# Patient Record
Sex: FEMALE | Race: WHITE
[De-identification: names, ages, dates, MRNs, and addresses within clinical notes are randomized per-mention and may not be internally consistent; named-entity substitution may affect disease eponyms.]

---

## 2018-04-26 ENCOUNTER — HOSPITAL ENCOUNTER (OUTPATIENT)
Dept: HOSPITAL 8 - CFH | Age: 78
Discharge: HOME | End: 2018-04-26
Payer: MEDICARE

## 2018-04-26 DIAGNOSIS — M81.0: ICD-10-CM

## 2018-04-26 DIAGNOSIS — R60.0: ICD-10-CM

## 2018-04-26 DIAGNOSIS — M85.88: Primary | ICD-10-CM

## 2018-04-26 PROCEDURE — 77080 DXA BONE DENSITY AXIAL: CPT

## 2018-07-17 ENCOUNTER — HOSPITAL ENCOUNTER (INPATIENT)
Dept: HOSPITAL 8 - ED | Age: 78
LOS: 3 days | Discharge: HOME | DRG: 871 | End: 2018-07-20
Attending: HOSPITALIST | Admitting: HOSPITALIST
Payer: MEDICARE

## 2018-07-17 VITALS — DIASTOLIC BLOOD PRESSURE: 72 MMHG | SYSTOLIC BLOOD PRESSURE: 108 MMHG

## 2018-07-17 VITALS — HEIGHT: 64 IN | BODY MASS INDEX: 29.85 KG/M2 | WEIGHT: 174.83 LBS

## 2018-07-17 VITALS — SYSTOLIC BLOOD PRESSURE: 134 MMHG | DIASTOLIC BLOOD PRESSURE: 68 MMHG

## 2018-07-17 DIAGNOSIS — N39.0: ICD-10-CM

## 2018-07-17 DIAGNOSIS — E87.2: ICD-10-CM

## 2018-07-17 DIAGNOSIS — E78.5: ICD-10-CM

## 2018-07-17 DIAGNOSIS — I10: ICD-10-CM

## 2018-07-17 DIAGNOSIS — Z79.891: ICD-10-CM

## 2018-07-17 DIAGNOSIS — E78.00: ICD-10-CM

## 2018-07-17 DIAGNOSIS — Z90.89: ICD-10-CM

## 2018-07-17 DIAGNOSIS — E44.1: ICD-10-CM

## 2018-07-17 DIAGNOSIS — E86.0: ICD-10-CM

## 2018-07-17 DIAGNOSIS — N17.0: ICD-10-CM

## 2018-07-17 DIAGNOSIS — Z96.659: ICD-10-CM

## 2018-07-17 DIAGNOSIS — Z87.891: ICD-10-CM

## 2018-07-17 DIAGNOSIS — Z90.49: ICD-10-CM

## 2018-07-17 DIAGNOSIS — E87.6: ICD-10-CM

## 2018-07-17 DIAGNOSIS — A41.9: Primary | ICD-10-CM

## 2018-07-17 DIAGNOSIS — Z82.49: ICD-10-CM

## 2018-07-17 DIAGNOSIS — J98.11: ICD-10-CM

## 2018-07-17 DIAGNOSIS — Z90.710: ICD-10-CM

## 2018-07-17 LAB
ALBUMIN SERPL-MCNC: 2.9 G/DL (ref 3.4–5)
ANION GAP SERPL CALC-SCNC: 13 MMOL/L (ref 5–15)
BASOPHILS # BLD AUTO: 0.01 X10^3/UL (ref 0–0.1)
BASOPHILS NFR BLD AUTO: 0 % (ref 0–1)
CALCIUM SERPL-MCNC: 9.1 MG/DL (ref 8.5–10.1)
CHLORIDE SERPL-SCNC: 104 MMOL/L (ref 98–107)
CREAT SERPL-MCNC: 3.24 MG/DL (ref 0.55–1.02)
CULTURE INDICATED?: YES
EOSINOPHIL # BLD AUTO: 0.54 X10^3/UL (ref 0–0.4)
EOSINOPHIL NFR BLD AUTO: 4 % (ref 1–7)
ERYTHROCYTE [DISTWIDTH] IN BLOOD BY AUTOMATED COUNT: 13.6 % (ref 9.6–15.2)
LYMPHOCYTES # BLD AUTO: 0.54 X10^3/UL (ref 1–3.4)
LYMPHOCYTES NFR BLD AUTO: 4 % (ref 22–44)
MCH RBC QN AUTO: 30.6 PG (ref 27–34.8)
MCHC RBC AUTO-ENTMCNC: 33.6 G/DL (ref 32.4–35.8)
MCV RBC AUTO: 91.2 FL (ref 80–100)
MD: NO
MICROSCOPIC: (no result)
MONOCYTES # BLD AUTO: 0.81 X10^3/UL (ref 0.2–0.8)
MONOCYTES NFR BLD AUTO: 5 % (ref 2–9)
NEUTROPHILS # BLD AUTO: 13.5 X10^3/UL (ref 1.8–6.8)
NEUTROPHILS NFR BLD AUTO: 88 % (ref 42–75)
PLATELET # BLD AUTO: 223 X10^3/UL (ref 130–400)
PMV BLD AUTO: 8.7 FL (ref 7.4–10.4)
RBC # BLD AUTO: 5.2 X10^6/UL (ref 3.82–5.3)

## 2018-07-17 PROCEDURE — 93005 ELECTROCARDIOGRAM TRACING: CPT

## 2018-07-17 PROCEDURE — 84133 ASSAY OF URINE POTASSIUM: CPT

## 2018-07-17 PROCEDURE — 85025 COMPLETE CBC W/AUTO DIFF WBC: CPT

## 2018-07-17 PROCEDURE — 87040 BLOOD CULTURE FOR BACTERIA: CPT

## 2018-07-17 PROCEDURE — 76770 US EXAM ABDO BACK WALL COMP: CPT

## 2018-07-17 PROCEDURE — 99285 EMERGENCY DEPT VISIT HI MDM: CPT

## 2018-07-17 PROCEDURE — 83735 ASSAY OF MAGNESIUM: CPT

## 2018-07-17 PROCEDURE — 82570 ASSAY OF URINE CREATININE: CPT

## 2018-07-17 PROCEDURE — 36415 COLL VENOUS BLD VENIPUNCTURE: CPT

## 2018-07-17 PROCEDURE — 82436 ASSAY OF URINE CHLORIDE: CPT

## 2018-07-17 PROCEDURE — 81001 URINALYSIS AUTO W/SCOPE: CPT

## 2018-07-17 PROCEDURE — 71045 X-RAY EXAM CHEST 1 VIEW: CPT

## 2018-07-17 PROCEDURE — 82040 ASSAY OF SERUM ALBUMIN: CPT

## 2018-07-17 PROCEDURE — 83605 ASSAY OF LACTIC ACID: CPT

## 2018-07-17 PROCEDURE — 84100 ASSAY OF PHOSPHORUS: CPT

## 2018-07-17 PROCEDURE — 84145 PROCALCITONIN (PCT): CPT

## 2018-07-17 PROCEDURE — 87086 URINE CULTURE/COLONY COUNT: CPT

## 2018-07-17 PROCEDURE — 96365 THER/PROPH/DIAG IV INF INIT: CPT

## 2018-07-17 PROCEDURE — 93970 EXTREMITY STUDY: CPT

## 2018-07-17 PROCEDURE — 80048 BASIC METABOLIC PNL TOTAL CA: CPT

## 2018-07-17 PROCEDURE — 84300 ASSAY OF URINE SODIUM: CPT

## 2018-07-17 PROCEDURE — 80069 RENAL FUNCTION PANEL: CPT

## 2018-07-17 RX ADMIN — SODIUM CHLORIDE SCH MLS/HR: 0.9 INJECTION, SOLUTION INTRAVENOUS at 23:54

## 2018-07-17 RX ADMIN — SODIUM CHLORIDE SCH MLS/HR: 0.9 INJECTION, SOLUTION INTRAVENOUS at 18:00

## 2018-07-17 RX ADMIN — CEFTRIAXONE SCH MLS/HR: 2 INJECTION, POWDER, FOR SOLUTION INTRAMUSCULAR; INTRAVENOUS at 23:53

## 2018-07-17 RX ADMIN — BUDESONIDE AND FORMOTEROL FUMARATE DIHYDRATE SCH MG: 160; 4.5 AEROSOL RESPIRATORY (INHALATION) at 20:30

## 2018-07-17 RX ADMIN — PRAVASTATIN SODIUM SCH MG: 40 TABLET ORAL at 20:31

## 2018-07-17 RX ADMIN — ZOLPIDEM TARTRATE SCH MG: 10 TABLET, FILM COATED ORAL at 21:58

## 2018-07-18 VITALS — SYSTOLIC BLOOD PRESSURE: 118 MMHG | DIASTOLIC BLOOD PRESSURE: 71 MMHG

## 2018-07-18 VITALS — SYSTOLIC BLOOD PRESSURE: 98 MMHG | DIASTOLIC BLOOD PRESSURE: 62 MMHG

## 2018-07-18 VITALS — DIASTOLIC BLOOD PRESSURE: 58 MMHG | SYSTOLIC BLOOD PRESSURE: 95 MMHG

## 2018-07-18 VITALS — SYSTOLIC BLOOD PRESSURE: 107 MMHG | DIASTOLIC BLOOD PRESSURE: 65 MMHG

## 2018-07-18 VITALS — DIASTOLIC BLOOD PRESSURE: 66 MMHG | SYSTOLIC BLOOD PRESSURE: 106 MMHG

## 2018-07-18 LAB
<PLATELET ESTIMATE>: ADEQUATE
<PLT MORPHOLOGY>: (no result)
ALBUMIN SERPL-MCNC: 2.3 G/DL (ref 3.4–5)
ANION GAP SERPL CALC-SCNC: 10 MMOL/L (ref 5–15)
ANION GAP SERPL CALC-SCNC: 11 MMOL/L (ref 5–15)
BILIRUB DIRECT SERPL-MCNC: NORMAL MG/DL
CALCIUM SERPL-MCNC: 7.8 MG/DL (ref 8.5–10.1)
CALCIUM SERPL-MCNC: 8.2 MG/DL (ref 8.5–10.1)
CHLORIDE SERPL-SCNC: 110 MMOL/L (ref 98–107)
CHLORIDE SERPL-SCNC: 112 MMOL/L (ref 98–107)
CHLORIDE,URINE RANDOM: 58 MMOL/L
CREAT SERPL-MCNC: 1.97 MG/DL (ref 0.55–1.02)
CREAT SERPL-MCNC: 2.25 MG/DL (ref 0.55–1.02)
EOS#(MANUAL): 0.27 X10^3/UL (ref 0–0.4)
EOS% (MANUAL): 3 % (ref 1–7)
ERYTHROCYTE [DISTWIDTH] IN BLOOD BY AUTOMATED COUNT: 13.9 % (ref 9.6–15.2)
HBV SURFACE AB SER RIA-ACNC: 55.2 MG/DL
LYMPH#(MANUAL): 1.17 X10^3/UL (ref 1–3.4)
LYMPHS% (MANUAL): 13 % (ref 22–44)
MCH RBC QN AUTO: 30.8 PG (ref 27–34.8)
MCHC RBC AUTO-ENTMCNC: 33.6 G/DL (ref 32.4–35.8)
MCV RBC AUTO: 91.6 FL (ref 80–100)
MD: YES
MONOS#(MANUAL): 0.99 X10^3/UL (ref 0.3–2.7)
MONOS% (MANUAL): 11 % (ref 2–9)
PLATELET # BLD AUTO: 197 X10^3/UL (ref 130–400)
PMV BLD AUTO: 9 FL (ref 7.4–10.4)
POTASSIUM UR-SCNC: 13 MMOL/L
RBC # BLD AUTO: 4.09 X10^6/UL (ref 3.82–5.3)
SEG#(MANUAL): 6.57 X10^3/UL (ref 1.8–6.8)
SEGS% (MANUAL): 73 % (ref 42–75)
SODIUM,URINE RANDOM: 55 MMOL/L

## 2018-07-18 RX ADMIN — GABAPENTIN SCH MG: 300 CAPSULE ORAL at 08:13

## 2018-07-18 RX ADMIN — CEFTRIAXONE SCH MLS/HR: 2 INJECTION, POWDER, FOR SOLUTION INTRAMUSCULAR; INTRAVENOUS at 23:40

## 2018-07-18 RX ADMIN — ENOXAPARIN SODIUM SCH MG: 30 INJECTION SUBCUTANEOUS at 20:00

## 2018-07-18 RX ADMIN — SODIUM CHLORIDE SCH MLS/HR: 0.9 INJECTION, SOLUTION INTRAVENOUS at 08:13

## 2018-07-18 RX ADMIN — BUDESONIDE AND FORMOTEROL FUMARATE DIHYDRATE SCH MG: 160; 4.5 AEROSOL RESPIRATORY (INHALATION) at 08:12

## 2018-07-18 RX ADMIN — SODIUM CHLORIDE SCH MLS/HR: 0.9 INJECTION, SOLUTION INTRAVENOUS at 16:02

## 2018-07-18 RX ADMIN — ZOLPIDEM TARTRATE SCH MG: 10 TABLET, FILM COATED ORAL at 21:30

## 2018-07-18 RX ADMIN — CALCIUM CARBONATE-CHOLECALCIFEROL TAB 250 MG-125 UNIT SCH TAB: 250-125 TAB at 08:14

## 2018-07-18 RX ADMIN — SODIUM CHLORIDE SCH MLS/HR: 0.9 INJECTION, SOLUTION INTRAVENOUS at 23:40

## 2018-07-18 RX ADMIN — POTASSIUM CHLORIDE SCH MEQ: 20 TABLET, EXTENDED RELEASE ORAL at 17:40

## 2018-07-18 RX ADMIN — PRAVASTATIN SODIUM SCH MG: 40 TABLET ORAL at 21:30

## 2018-07-18 RX ADMIN — BUDESONIDE AND FORMOTEROL FUMARATE DIHYDRATE SCH MG: 160; 4.5 AEROSOL RESPIRATORY (INHALATION) at 21:00

## 2018-07-18 RX ADMIN — AMLODIPINE BESYLATE SCH MG: 5 TABLET ORAL at 21:30

## 2018-07-19 VITALS — DIASTOLIC BLOOD PRESSURE: 62 MMHG | SYSTOLIC BLOOD PRESSURE: 102 MMHG

## 2018-07-19 VITALS — DIASTOLIC BLOOD PRESSURE: 97 MMHG | SYSTOLIC BLOOD PRESSURE: 158 MMHG

## 2018-07-19 VITALS — DIASTOLIC BLOOD PRESSURE: 72 MMHG | SYSTOLIC BLOOD PRESSURE: 123 MMHG

## 2018-07-19 VITALS — SYSTOLIC BLOOD PRESSURE: 143 MMHG | DIASTOLIC BLOOD PRESSURE: 72 MMHG

## 2018-07-19 LAB
ANION GAP SERPL CALC-SCNC: 10 MMOL/L (ref 5–15)
BASOPHILS # BLD AUTO: 0.01 X10^3/UL (ref 0–0.1)
BASOPHILS NFR BLD AUTO: 0 % (ref 0–1)
CALCIUM SERPL-MCNC: 8 MG/DL (ref 8.5–10.1)
CHLORIDE SERPL-SCNC: 118 MMOL/L (ref 98–107)
CREAT SERPL-MCNC: 1.73 MG/DL (ref 0.55–1.02)
EOSINOPHIL # BLD AUTO: 0.62 X10^3/UL (ref 0–0.4)
EOSINOPHIL NFR BLD AUTO: 8 % (ref 1–7)
ERYTHROCYTE [DISTWIDTH] IN BLOOD BY AUTOMATED COUNT: 14.1 % (ref 9.6–15.2)
LYMPHOCYTES # BLD AUTO: 1.4 X10^3/UL (ref 1–3.4)
LYMPHOCYTES NFR BLD AUTO: 17 % (ref 22–44)
MCH RBC QN AUTO: 30.2 PG (ref 27–34.8)
MCHC RBC AUTO-ENTMCNC: 32.8 G/DL (ref 32.4–35.8)
MCV RBC AUTO: 92.2 FL (ref 80–100)
MD: NO
MONOCYTES # BLD AUTO: 0.67 X10^3/UL (ref 0.2–0.8)
MONOCYTES NFR BLD AUTO: 8 % (ref 2–9)
NEUTROPHILS # BLD AUTO: 5.38 X10^3/UL (ref 1.8–6.8)
NEUTROPHILS NFR BLD AUTO: 67 % (ref 42–75)
PLATELET # BLD AUTO: 216 X10^3/UL (ref 130–400)
PMV BLD AUTO: 8.5 FL (ref 7.4–10.4)
RBC # BLD AUTO: 4.03 X10^6/UL (ref 3.82–5.3)

## 2018-07-19 RX ADMIN — ENOXAPARIN SODIUM SCH MG: 30 INJECTION SUBCUTANEOUS at 20:00

## 2018-07-19 RX ADMIN — AMLODIPINE BESYLATE SCH MG: 5 TABLET ORAL at 21:28

## 2018-07-19 RX ADMIN — ZOLPIDEM TARTRATE SCH MG: 10 TABLET, FILM COATED ORAL at 21:29

## 2018-07-19 RX ADMIN — SODIUM CHLORIDE SCH MLS/HR: 0.9 INJECTION, SOLUTION INTRAVENOUS at 07:41

## 2018-07-19 RX ADMIN — GABAPENTIN SCH MG: 300 CAPSULE ORAL at 07:54

## 2018-07-19 RX ADMIN — BUDESONIDE AND FORMOTEROL FUMARATE DIHYDRATE SCH MG: 160; 4.5 AEROSOL RESPIRATORY (INHALATION) at 21:00

## 2018-07-19 RX ADMIN — CALCIUM CARBONATE-CHOLECALCIFEROL TAB 250 MG-125 UNIT SCH TAB: 250-125 TAB at 07:55

## 2018-07-19 RX ADMIN — POTASSIUM CHLORIDE SCH MEQ: 20 TABLET, EXTENDED RELEASE ORAL at 07:41

## 2018-07-19 RX ADMIN — CEFTRIAXONE SCH MLS/HR: 2 INJECTION, POWDER, FOR SOLUTION INTRAMUSCULAR; INTRAVENOUS at 23:40

## 2018-07-19 RX ADMIN — BUDESONIDE AND FORMOTEROL FUMARATE DIHYDRATE SCH MG: 160; 4.5 AEROSOL RESPIRATORY (INHALATION) at 07:54

## 2018-07-19 RX ADMIN — SODIUM CHLORIDE SCH MLS/HR: 0.9 INJECTION, SOLUTION INTRAVENOUS at 16:19

## 2018-07-19 RX ADMIN — PRAVASTATIN SODIUM SCH MG: 40 TABLET ORAL at 21:29

## 2018-07-20 VITALS — SYSTOLIC BLOOD PRESSURE: 127 MMHG | DIASTOLIC BLOOD PRESSURE: 66 MMHG

## 2018-07-20 VITALS — DIASTOLIC BLOOD PRESSURE: 70 MMHG | SYSTOLIC BLOOD PRESSURE: 121 MMHG

## 2018-07-20 VITALS — DIASTOLIC BLOOD PRESSURE: 72 MMHG | SYSTOLIC BLOOD PRESSURE: 116 MMHG

## 2018-07-20 LAB
ANION GAP SERPL CALC-SCNC: 10 MMOL/L (ref 5–15)
CALCIUM SERPL-MCNC: 8.2 MG/DL (ref 8.5–10.1)
CHLORIDE SERPL-SCNC: 118 MMOL/L (ref 98–107)
CREAT SERPL-MCNC: 1.29 MG/DL (ref 0.55–1.02)

## 2018-07-20 RX ADMIN — SODIUM CHLORIDE SCH MLS/HR: 0.9 INJECTION, SOLUTION INTRAVENOUS at 01:04

## 2018-07-20 RX ADMIN — GABAPENTIN SCH MG: 300 CAPSULE ORAL at 07:30

## 2018-07-20 RX ADMIN — CALCIUM CARBONATE-CHOLECALCIFEROL TAB 250 MG-125 UNIT SCH TAB: 250-125 TAB at 08:13

## 2018-07-20 RX ADMIN — BUDESONIDE AND FORMOTEROL FUMARATE DIHYDRATE SCH MG: 160; 4.5 AEROSOL RESPIRATORY (INHALATION) at 07:30

## 2018-07-20 RX ADMIN — AMLODIPINE BESYLATE SCH MG: 5 TABLET ORAL at 08:13

## 2018-07-20 RX ADMIN — SODIUM CHLORIDE SCH MLS/HR: 0.9 INJECTION, SOLUTION INTRAVENOUS at 08:14

## 2020-08-20 ENCOUNTER — HOSPITAL ENCOUNTER (OUTPATIENT)
Dept: HOSPITAL 8 - STAR | Age: 80
Discharge: HOME | End: 2020-08-20
Attending: ORTHOPAEDIC SURGERY
Payer: MEDICARE

## 2020-08-20 DIAGNOSIS — M16.11: ICD-10-CM

## 2020-08-20 DIAGNOSIS — R00.1: ICD-10-CM

## 2020-08-20 DIAGNOSIS — M25.551: ICD-10-CM

## 2020-08-20 DIAGNOSIS — Z11.59: ICD-10-CM

## 2020-08-20 DIAGNOSIS — I21.9: ICD-10-CM

## 2020-08-20 DIAGNOSIS — Z01.818: Primary | ICD-10-CM

## 2020-08-20 LAB
ALBUMIN SERPL-MCNC: 3.5 G/DL (ref 3.4–5)
ALP SERPL-CCNC: 93 U/L (ref 45–117)
ALT SERPL-CCNC: 21 U/L (ref 12–78)
ANION GAP SERPL CALC-SCNC: 7 MMOL/L (ref 5–15)
BASOPHILS # BLD AUTO: 0.06 X10^3/UL (ref 0–0.1)
BASOPHILS NFR BLD AUTO: 1 % (ref 0–1)
BILIRUB SERPL-MCNC: 0.9 MG/DL (ref 0.2–1)
CALCIUM SERPL-MCNC: 9.7 MG/DL (ref 8.5–10.1)
CHLORIDE SERPL-SCNC: 110 MMOL/L (ref 98–107)
CREAT SERPL-MCNC: 1.52 MG/DL (ref 0.55–1.02)
EOSINOPHIL # BLD AUTO: 0.45 X10^3/UL (ref 0–0.4)
EOSINOPHIL NFR BLD AUTO: 4 % (ref 1–7)
ERYTHROCYTE [DISTWIDTH] IN BLOOD BY AUTOMATED COUNT: 13.8 % (ref 9.6–15.2)
LYMPHOCYTES # BLD AUTO: 2.21 X10^3/UL (ref 1–3.4)
LYMPHOCYTES NFR BLD AUTO: 17 % (ref 22–44)
MCH RBC QN AUTO: 30.1 PG (ref 27–34.8)
MCHC RBC AUTO-ENTMCNC: 33.2 G/DL (ref 32.4–35.8)
MCV RBC AUTO: 90.8 FL (ref 80–100)
MD: NO
MONOCYTES # BLD AUTO: 1.22 X10^3/UL (ref 0.2–0.8)
MONOCYTES NFR BLD AUTO: 10 % (ref 2–9)
NEUTROPHILS # BLD AUTO: 8.92 X10^3/UL (ref 1.8–6.8)
NEUTROPHILS NFR BLD AUTO: 69 % (ref 42–75)
PLATELET # BLD AUTO: 349 X10^3/UL (ref 130–400)
PMV BLD AUTO: 8.4 FL (ref 7.4–10.4)
PROT SERPL-MCNC: 6.8 G/DL (ref 6.4–8.2)
RBC # BLD AUTO: 4.75 X10^6/UL (ref 3.82–5.3)

## 2020-08-20 PROCEDURE — 87635 SARS-COV-2 COVID-19 AMP PRB: CPT

## 2020-08-20 PROCEDURE — 36415 COLL VENOUS BLD VENIPUNCTURE: CPT

## 2020-08-20 PROCEDURE — 80053 COMPREHEN METABOLIC PANEL: CPT

## 2020-08-20 PROCEDURE — 85025 COMPLETE CBC W/AUTO DIFF WBC: CPT

## 2020-08-20 PROCEDURE — 87147 CULTURE TYPE IMMUNOLOGIC: CPT

## 2020-08-20 PROCEDURE — 87081 CULTURE SCREEN ONLY: CPT

## 2020-08-20 PROCEDURE — 93005 ELECTROCARDIOGRAM TRACING: CPT

## 2020-08-24 ENCOUNTER — HOSPITAL ENCOUNTER (INPATIENT)
Dept: HOSPITAL 8 - OUT | Age: 80
LOS: 2 days | Discharge: HOME | DRG: 470 | End: 2020-08-26
Attending: ORTHOPAEDIC SURGERY | Admitting: ORTHOPAEDIC SURGERY
Payer: MEDICARE

## 2020-08-24 VITALS — BODY MASS INDEX: 23.52 KG/M2 | HEIGHT: 64 IN | WEIGHT: 137.79 LBS

## 2020-08-24 VITALS — SYSTOLIC BLOOD PRESSURE: 139 MMHG | DIASTOLIC BLOOD PRESSURE: 66 MMHG

## 2020-08-24 VITALS — DIASTOLIC BLOOD PRESSURE: 66 MMHG | SYSTOLIC BLOOD PRESSURE: 127 MMHG

## 2020-08-24 VITALS — SYSTOLIC BLOOD PRESSURE: 98 MMHG | DIASTOLIC BLOOD PRESSURE: 42 MMHG

## 2020-08-24 DIAGNOSIS — Z91.048: ICD-10-CM

## 2020-08-24 DIAGNOSIS — M25.051: ICD-10-CM

## 2020-08-24 DIAGNOSIS — M16.11: Primary | ICD-10-CM

## 2020-08-24 DIAGNOSIS — Z96.641: ICD-10-CM

## 2020-08-24 PROCEDURE — 80048 BASIC METABOLIC PNL TOTAL CA: CPT

## 2020-08-24 PROCEDURE — 85014 HEMATOCRIT: CPT

## 2020-08-24 PROCEDURE — C1776 JOINT DEVICE (IMPLANTABLE): HCPCS

## 2020-08-24 PROCEDURE — 82040 ASSAY OF SERUM ALBUMIN: CPT

## 2020-08-24 PROCEDURE — 36415 COLL VENOUS BLD VENIPUNCTURE: CPT

## 2020-08-24 PROCEDURE — 86850 RBC ANTIBODY SCREEN: CPT

## 2020-08-24 PROCEDURE — 85018 HEMOGLOBIN: CPT

## 2020-08-24 PROCEDURE — 86900 BLOOD TYPING SEROLOGIC ABO: CPT

## 2020-08-24 PROCEDURE — C1713 ANCHOR/SCREW BN/BN,TIS/BN: HCPCS

## 2020-08-24 PROCEDURE — 72170 X-RAY EXAM OF PELVIS: CPT

## 2020-08-24 PROCEDURE — 0SR906A REPLACEMENT OF RIGHT HIP JOINT WITH OXIDIZED ZIRCONIUM ON POLYETHYLENE SYNTHETIC SUBSTITUTE, UNCEMENTED, OPEN APPROACH: ICD-10-PCS | Performed by: ORTHOPAEDIC SURGERY

## 2020-08-24 RX ADMIN — SODIUM CHLORIDE SCH MLS/HR: 0.9 INJECTION, SOLUTION INTRAVENOUS at 18:15

## 2020-08-24 RX ADMIN — ASPIRIN SCH MG: 81 TABLET, COATED ORAL at 19:48

## 2020-08-24 RX ADMIN — DIAZEPAM PRN MG: 5 TABLET ORAL at 23:47

## 2020-08-24 RX ADMIN — ACETAMINOPHEN SCH MG: 500 TABLET, COATED ORAL at 18:15

## 2020-08-24 RX ADMIN — ACETAMINOPHEN SCH MG: 500 TABLET, COATED ORAL at 23:35

## 2020-08-24 RX ADMIN — CEFAZOLIN SODIUM SCH MLS/HR: 1 SOLUTION INTRAVENOUS at 23:35

## 2020-08-24 RX ADMIN — NEBIVOLOL HYDROCHLORIDE SCH MG: 5 TABLET ORAL at 19:51

## 2020-08-24 RX ADMIN — FENTANYL CITRATE PRN MCG: 50 INJECTION INTRAMUSCULAR; INTRAVENOUS at 15:10

## 2020-08-24 RX ADMIN — OXYCODONE HYDROCHLORIDE PRN MG: 5 TABLET ORAL at 23:49

## 2020-08-24 RX ADMIN — OXYCODONE HYDROCHLORIDE PRN MG: 5 TABLET ORAL at 19:50

## 2020-08-24 RX ADMIN — FENTANYL CITRATE PRN MCG: 50 INJECTION INTRAMUSCULAR; INTRAVENOUS at 15:40

## 2020-08-24 RX ADMIN — DOCUSATE SODIUM SCH MG: 100 CAPSULE, LIQUID FILLED ORAL at 19:47

## 2020-08-25 VITALS — SYSTOLIC BLOOD PRESSURE: 123 MMHG | DIASTOLIC BLOOD PRESSURE: 54 MMHG

## 2020-08-25 VITALS — SYSTOLIC BLOOD PRESSURE: 108 MMHG | DIASTOLIC BLOOD PRESSURE: 61 MMHG

## 2020-08-25 VITALS — DIASTOLIC BLOOD PRESSURE: 57 MMHG | SYSTOLIC BLOOD PRESSURE: 135 MMHG

## 2020-08-25 LAB
ALBUMIN SERPL-MCNC: 2.8 G/DL (ref 3.4–5)
ANION GAP SERPL CALC-SCNC: 8 MMOL/L (ref 5–15)
CALCIUM SERPL-MCNC: 8.9 MG/DL (ref 8.5–10.1)
CHLORIDE SERPL-SCNC: 110 MMOL/L (ref 98–107)
CREAT SERPL-MCNC: 2.2 MG/DL (ref 0.55–1.02)

## 2020-08-25 RX ADMIN — ASPIRIN SCH MG: 81 TABLET, COATED ORAL at 20:41

## 2020-08-25 RX ADMIN — HYDROCHLOROTHIAZIDE SCH MG: 12.5 CAPSULE ORAL at 08:48

## 2020-08-25 RX ADMIN — SODIUM CHLORIDE SCH MLS/HR: 0.9 INJECTION, SOLUTION INTRAVENOUS at 10:32

## 2020-08-25 RX ADMIN — ACETAMINOPHEN SCH MG: 500 TABLET, COATED ORAL at 06:16

## 2020-08-25 RX ADMIN — OXYCODONE HYDROCHLORIDE PRN MG: 5 TABLET ORAL at 04:16

## 2020-08-25 RX ADMIN — SODIUM CHLORIDE SCH MLS/HR: 0.9 INJECTION, SOLUTION INTRAVENOUS at 20:32

## 2020-08-25 RX ADMIN — DOCUSATE SODIUM SCH MG: 100 CAPSULE, LIQUID FILLED ORAL at 20:41

## 2020-08-25 RX ADMIN — Medication SCH TAB: at 08:46

## 2020-08-25 RX ADMIN — OXYCODONE HYDROCHLORIDE PRN MG: 5 TABLET ORAL at 12:50

## 2020-08-25 RX ADMIN — DOCUSATE SODIUM SCH MG: 100 CAPSULE, LIQUID FILLED ORAL at 08:45

## 2020-08-25 RX ADMIN — ACETAMINOPHEN SCH MG: 500 TABLET, COATED ORAL at 18:19

## 2020-08-25 RX ADMIN — OXYCODONE HYDROCHLORIDE PRN MG: 5 TABLET ORAL at 08:47

## 2020-08-25 RX ADMIN — OXYCODONE HYDROCHLORIDE PRN MG: 5 TABLET ORAL at 18:20

## 2020-08-25 RX ADMIN — OXYCODONE HYDROCHLORIDE PRN MG: 5 TABLET ORAL at 22:21

## 2020-08-25 RX ADMIN — ACETAMINOPHEN SCH MG: 500 TABLET, COATED ORAL at 12:39

## 2020-08-25 RX ADMIN — CEFAZOLIN SODIUM SCH MLS/HR: 1 SOLUTION INTRAVENOUS at 07:30

## 2020-08-25 RX ADMIN — SODIUM CHLORIDE SCH MLS/HR: 0.9 INJECTION, SOLUTION INTRAVENOUS at 00:32

## 2020-08-25 RX ADMIN — ASPIRIN SCH MG: 81 TABLET, COATED ORAL at 08:44

## 2020-08-26 VITALS — SYSTOLIC BLOOD PRESSURE: 130 MMHG | DIASTOLIC BLOOD PRESSURE: 74 MMHG

## 2020-08-26 VITALS — SYSTOLIC BLOOD PRESSURE: 135 MMHG | DIASTOLIC BLOOD PRESSURE: 67 MMHG

## 2020-08-26 VITALS — SYSTOLIC BLOOD PRESSURE: 146 MMHG | DIASTOLIC BLOOD PRESSURE: 64 MMHG

## 2020-08-26 RX ADMIN — OXYCODONE HYDROCHLORIDE PRN MG: 5 TABLET ORAL at 03:28

## 2020-08-26 RX ADMIN — NEBIVOLOL HYDROCHLORIDE SCH MG: 5 TABLET ORAL at 05:48

## 2020-08-26 RX ADMIN — ACETAMINOPHEN SCH MG: 500 TABLET, COATED ORAL at 00:00

## 2020-08-26 RX ADMIN — ASPIRIN SCH MG: 81 TABLET, COATED ORAL at 09:04

## 2020-08-26 RX ADMIN — OXYCODONE HYDROCHLORIDE PRN MG: 5 TABLET ORAL at 08:26

## 2020-08-26 RX ADMIN — SODIUM CHLORIDE SCH MLS/HR: 0.9 INJECTION, SOLUTION INTRAVENOUS at 05:32

## 2020-08-26 RX ADMIN — HYDROCHLOROTHIAZIDE SCH MG: 12.5 CAPSULE ORAL at 09:00

## 2020-08-26 RX ADMIN — DOCUSATE SODIUM SCH MG: 100 CAPSULE, LIQUID FILLED ORAL at 09:04

## 2020-08-26 RX ADMIN — ACETAMINOPHEN SCH MG: 500 TABLET, COATED ORAL at 05:48

## 2020-08-26 RX ADMIN — DIAZEPAM PRN MG: 5 TABLET ORAL at 05:59

## 2020-08-26 RX ADMIN — Medication SCH TAB: at 09:04

## 2020-11-25 ENCOUNTER — HOSPITAL ENCOUNTER (EMERGENCY)
Dept: HOSPITAL 8 - ED | Age: 80
Discharge: HOME | End: 2020-11-25
Payer: MEDICARE

## 2020-11-25 VITALS — BODY MASS INDEX: 21.38 KG/M2 | WEIGHT: 125.22 LBS | HEIGHT: 64 IN

## 2020-11-25 VITALS — SYSTOLIC BLOOD PRESSURE: 124 MMHG | DIASTOLIC BLOOD PRESSURE: 74 MMHG

## 2020-11-25 DIAGNOSIS — Z20.828: ICD-10-CM

## 2020-11-25 DIAGNOSIS — R00.1: ICD-10-CM

## 2020-11-25 DIAGNOSIS — R06.03: Primary | ICD-10-CM

## 2020-11-25 DIAGNOSIS — I10: ICD-10-CM

## 2020-11-25 LAB
ALBUMIN SERPL-MCNC: 3.1 G/DL (ref 3.4–5)
ALP SERPL-CCNC: 92 U/L (ref 45–117)
ALT SERPL-CCNC: 18 U/L (ref 12–78)
ANION GAP SERPL CALC-SCNC: 10 MMOL/L (ref 5–15)
BASOPHILS # BLD AUTO: 0.1 X10^3/UL (ref 0–0.1)
BASOPHILS NFR BLD AUTO: 1 % (ref 0–1)
BILIRUB SERPL-MCNC: 1.3 MG/DL (ref 0.2–1)
CALCIUM SERPL-MCNC: 8.7 MG/DL (ref 8.5–10.1)
CHLORIDE SERPL-SCNC: 108 MMOL/L (ref 98–107)
CREAT SERPL-MCNC: 2.13 MG/DL (ref 0.55–1.02)
CRP SERPL-MCNC: 3.1 MG/DL (ref 0.02–0.49)
EOSINOPHIL # BLD AUTO: 0.1 X10^3/UL (ref 0–0.4)
EOSINOPHIL NFR BLD AUTO: 0 % (ref 1–7)
ERYTHROCYTE [DISTWIDTH] IN BLOOD BY AUTOMATED COUNT: 13.8 % (ref 9.6–15.2)
LYMPHOCYTES # BLD AUTO: 2.3 X10^3/UL (ref 1–3.4)
LYMPHOCYTES NFR BLD AUTO: 15 % (ref 22–44)
MCH RBC QN AUTO: 29.1 PG (ref 27–34.8)
MCHC RBC AUTO-ENTMCNC: 32.8 G/DL (ref 32.4–35.8)
MD: (no result)
MONOCYTES # BLD AUTO: 2.4 X10^3/UL (ref 0.2–0.8)
MONOCYTES NFR BLD AUTO: 15 % (ref 2–9)
NEUTROPHILS # BLD AUTO: 11.3 X10^3/UL (ref 1.8–6.8)
NEUTROPHILS NFR BLD AUTO: 70 % (ref 42–75)
PLATELET # BLD AUTO: 347 X10^3/UL (ref 130–400)
PMV BLD AUTO: 8.3 FL (ref 7.4–10.4)
PROT SERPL-MCNC: 6.9 G/DL (ref 6.4–8.2)
RBC # BLD AUTO: 4.81 X10^6/UL (ref 3.82–5.3)

## 2020-11-25 PROCEDURE — 86140 C-REACTIVE PROTEIN: CPT

## 2020-11-25 PROCEDURE — 71045 X-RAY EXAM CHEST 1 VIEW: CPT

## 2020-11-25 PROCEDURE — 83615 LACTATE (LD) (LDH) ENZYME: CPT

## 2020-11-25 PROCEDURE — 83605 ASSAY OF LACTIC ACID: CPT

## 2020-11-25 PROCEDURE — 87040 BLOOD CULTURE FOR BACTERIA: CPT

## 2020-11-25 PROCEDURE — 85025 COMPLETE CBC W/AUTO DIFF WBC: CPT

## 2020-11-25 PROCEDURE — 82728 ASSAY OF FERRITIN: CPT

## 2020-11-25 PROCEDURE — 87635 SARS-COV-2 COVID-19 AMP PRB: CPT

## 2020-11-25 PROCEDURE — 80053 COMPREHEN METABOLIC PANEL: CPT

## 2020-11-25 PROCEDURE — 84145 PROCALCITONIN (PCT): CPT

## 2020-11-25 PROCEDURE — 93005 ELECTROCARDIOGRAM TRACING: CPT

## 2020-11-25 PROCEDURE — 99285 EMERGENCY DEPT VISIT HI MDM: CPT

## 2020-11-25 NOTE — NUR
SPOKE W/ ERP DR. ELIAS IN REGARDS TO WBC 16.2, LA 2.2, AND PT'S NEED FOR 2L 
NC. PER ERP PT HAS BEEN SICK X 2 WEEKS, HAS NO DYSPNEA/SOB. PER ERP DR. ELIAS STATES PT IS CLINICALLY STABLE AND CAN GO HOME.

## 2020-11-25 NOTE — NUR
PT RESTING ON KASSIE. LUDIVINA. VSS. PT STATES SHE HAS NOT HEARD BACK FROM THE 
[employer] [facility], MEGAN MOSELEY RN NOTIFIED.

## 2020-11-25 NOTE — NUR
[facility] SENT [facility] VIA FAX. THEY ARE GOING TO 
CALL PT TO SET UP DELIVERY

## 2020-11-25 NOTE — NUR
PER KAYLEE AT [facility], "WE ARE PRETTY LOW. I'M NOT REALLY 
RELEASING ANY RIGHT NOW. YOU SHOULD TRY A DIFFERENT COMPANY."

## 2020-11-25 NOTE — NUR
PT TO ROOM 35 W/ C/O COUGH, FEVER X 5 DAYS, DIARRHEA X 2 WEEKS, FATIGUE. PT 
STATES MOST SX STARTED 2 WEEKS AGO. PT WENT TO  7 DAYS AGO AND HAD COVID TEST 
DONE AT [facility] ON [facility]. PT STATES SHE HAS NOT RECEIVED HER RESULTS YET. 
MONITORS APPLIED. O2 SATS NOTED TO BE 91% RA. PT RESTING ON GURNEY. NADN. 
POSITIONED FOR COMFORT. LYNETTE ENGLE AT BEDSIDE. AWARE OF PT TEMP.